# Patient Record
Sex: FEMALE | Race: BLACK OR AFRICAN AMERICAN | NOT HISPANIC OR LATINO
[De-identification: names, ages, dates, MRNs, and addresses within clinical notes are randomized per-mention and may not be internally consistent; named-entity substitution may affect disease eponyms.]

---

## 2023-08-15 ENCOUNTER — DASHBOARD ENCOUNTERS (OUTPATIENT)
Age: 78
End: 2023-08-15

## 2023-08-15 ENCOUNTER — OFFICE VISIT (OUTPATIENT)
Dept: URBAN - METROPOLITAN AREA CLINIC 84 | Facility: CLINIC | Age: 78
End: 2023-08-15
Payer: MEDICARE

## 2023-08-15 ENCOUNTER — WEB ENCOUNTER (OUTPATIENT)
Dept: URBAN - METROPOLITAN AREA CLINIC 84 | Facility: CLINIC | Age: 78
End: 2023-08-15

## 2023-08-15 VITALS
SYSTOLIC BLOOD PRESSURE: 130 MMHG | TEMPERATURE: 97.1 F | DIASTOLIC BLOOD PRESSURE: 92 MMHG | HEIGHT: 71 IN | WEIGHT: 205.2 LBS | BODY MASS INDEX: 28.73 KG/M2 | HEART RATE: 61 BPM

## 2023-08-15 DIAGNOSIS — R10.13 MIDEPIGASTRIC PAIN: ICD-10-CM

## 2023-08-15 DIAGNOSIS — R12 HEARTBURN: ICD-10-CM

## 2023-08-15 PROCEDURE — 99203 OFFICE O/P NEW LOW 30 MIN: CPT

## 2023-08-15 RX ORDER — ATORVASTATIN CALCIUM 10 MG/1
TAKE 1 TABLET BY MOUTH EVERY DAY TABLET ORAL
Qty: 90 EACH | Refills: 0 | Status: ACTIVE | COMMUNITY

## 2023-08-15 RX ORDER — ESOMEPRAZOLE MAGNESIUM 40 MG/1
TAKE 1 CAPSULE BY MOUTH EVERY DAY CAPSULE, DELAYED RELEASE ORAL
Qty: 30 EACH | Refills: 0 | Status: ACTIVE | COMMUNITY

## 2023-08-15 RX ORDER — METFORMIN HYDROCHLORIDE 500 MG/1
TAKE 1 TABLET BY MOUTH EVERY DAY TABLET, FILM COATED ORAL
Qty: 90 EACH | Refills: 0 | Status: ACTIVE | COMMUNITY

## 2023-08-15 RX ORDER — LOSARTAN POTASSIUM AND HYDROCHLOROTHIAZIDE 100; 12.5 MG/1; MG/1
1 TABLET TABLET, FILM COATED ORAL ONCE A DAY
Status: ACTIVE | COMMUNITY

## 2023-08-18 ENCOUNTER — OFFICE VISIT (OUTPATIENT)
Dept: URBAN - METROPOLITAN AREA SURGERY CENTER 20 | Facility: SURGERY CENTER | Age: 78
End: 2023-08-18

## 2023-09-05 ENCOUNTER — TELEPHONE ENCOUNTER (OUTPATIENT)
Dept: URBAN - METROPOLITAN AREA CLINIC 84 | Facility: CLINIC | Age: 78
End: 2023-09-05

## 2023-09-11 ENCOUNTER — TELEPHONE ENCOUNTER (OUTPATIENT)
Dept: URBAN - METROPOLITAN AREA CLINIC 25 | Facility: CLINIC | Age: 78
End: 2023-09-11

## 2023-10-24 ENCOUNTER — OFFICE VISIT (OUTPATIENT)
Dept: URBAN - METROPOLITAN AREA CLINIC 84 | Facility: CLINIC | Age: 78
End: 2023-10-24

## 2023-11-07 ENCOUNTER — OFFICE VISIT (OUTPATIENT)
Dept: URBAN - METROPOLITAN AREA MEDICAL CENTER 17 | Facility: MEDICAL CENTER | Age: 78
End: 2023-11-07